# Patient Record
Sex: MALE | Race: WHITE | Employment: OTHER | URBAN - METROPOLITAN AREA
[De-identification: names, ages, dates, MRNs, and addresses within clinical notes are randomized per-mention and may not be internally consistent; named-entity substitution may affect disease eponyms.]

---

## 2017-03-02 ENCOUNTER — TELEPHONE (OUTPATIENT)
Dept: FAMILY MEDICINE CLINIC | Facility: CLINIC | Age: 42
End: 2017-03-02

## 2017-03-02 NOTE — TELEPHONE ENCOUNTER
Patient informed no hep c testing was done previously. Patient verbalized understanding. Patient had no further questions.

## 2017-05-18 ENCOUNTER — CHARTING TRANS (OUTPATIENT)
Dept: OTHER | Age: 42
End: 2017-05-18

## 2019-02-18 ENCOUNTER — HOSPITAL ENCOUNTER (EMERGENCY)
Facility: HOSPITAL | Age: 44
Discharge: HOME | End: 2019-02-18
Payer: COMMERCIAL

## 2019-02-18 VITALS
SYSTOLIC BLOOD PRESSURE: 129 MMHG | DIASTOLIC BLOOD PRESSURE: 77 MMHG | OXYGEN SATURATION: 98 % | RESPIRATION RATE: 18 BRPM | TEMPERATURE: 98 F | HEART RATE: 75 BPM

## 2019-02-18 DIAGNOSIS — S01.112A EYEBROW LACERATION, LEFT, INITIAL ENCOUNTER: Primary | ICD-10-CM
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
## 2019-02-18 DIAGNOSIS — S00.81XA FOREHEAD ABRASION, INITIAL ENCOUNTER: ICD-10-CM

## 2019-02-18 PROCEDURE — 12013 RPR F/E/E/N/L/M 2.6-5.0 CM: CPT

## 2019-02-18 PROCEDURE — 99283 EMERGENCY DEPT VISIT LOW MDM: CPT

## 2019-02-18 NOTE — ED PROVIDER NOTES
Patient Seen in: [facility] Emergency Department    History   CC: eyebrow lac  HPI: Iwona Francois 41year old male  who presents to the ER c/o left eyebrow laceration status post injury in which patient states he was walking to his car and tripped a 98.1 °F (36.7 °C) (Oral)   Resp 18   SpO2 100%         General - Appears well, non-toxic and in NAD  Head - +multiple abrasions noted to forehead along w/ lac to left eyebrow.  Scalp symmetrical. No tenderness on palpation throughout, TMJ bilat without crep visualized or palpated. The wound was repaired with 4.0 prolene - 5 simple interrupted sutures placed. The wound repair was simple. The procedure was performed by myself. Pt tolerated the procedure well.     Wound care instructions reviewed with patient as

## 2019-02-25 ENCOUNTER — WALK IN (OUTPATIENT)
Dept: URGENT CARE | Age: 44
End: 2019-02-25

## 2019-02-25 DIAGNOSIS — Z48.02 VISIT FOR SUTURE REMOVAL: Primary | ICD-10-CM

## 2019-02-25 PROCEDURE — 99213 OFFICE O/P EST LOW 20 MIN: CPT | Performed by: FAMILY MEDICINE

## 2019-02-25 RX ORDER — ALBUTEROL SULFATE 90 UG/1
1-2 AEROSOL, METERED RESPIRATORY (INHALATION) PRN
COMMUNITY
Start: 2016-09-21

## 2019-02-25 ASSESSMENT — PAIN SCALES - GENERAL: PAINLEVEL: 1-2

## 2019-10-15 ENCOUNTER — PATIENT OUTREACH (OUTPATIENT)
Dept: FAMILY MEDICINE CLINIC | Facility: CLINIC | Age: 44
End: 2019-10-15

## 2020-02-06 ENCOUNTER — PATIENT OUTREACH (OUTPATIENT)
Dept: FAMILY MEDICINE CLINIC | Facility: CLINIC | Age: 45
End: 2020-02-06

## 2020-08-12 PROBLEM — H69.83 EUSTACHIAN TUBE DYSFUNCTION, BILATERAL: Status: ACTIVE | Noted: 2020-08-12

## 2020-08-12 PROBLEM — H93.299 ABNORMAL AUDITORY PERCEPTION, UNSPECIFIED LATERALITY: Status: ACTIVE | Noted: 2020-08-12

## 2020-08-12 PROBLEM — H69.93 EUSTACHIAN TUBE DYSFUNCTION, BILATERAL: Status: ACTIVE | Noted: 2020-08-12

## 2021-05-25 VITALS
DIASTOLIC BLOOD PRESSURE: 72 MMHG | HEART RATE: 62 BPM | OXYGEN SATURATION: 98 % | TEMPERATURE: 97.5 F | SYSTOLIC BLOOD PRESSURE: 110 MMHG | RESPIRATION RATE: 16 BRPM

## 2023-09-07 ENCOUNTER — OFFICE VISIT (OUTPATIENT)
Dept: SURGERY | Facility: CLINIC | Age: 48
End: 2023-09-07

## 2023-09-07 DIAGNOSIS — Z12.5 SCREENING PSA (PROSTATE SPECIFIC ANTIGEN): ICD-10-CM

## 2023-09-07 DIAGNOSIS — R82.90 URINE FINDING: Primary | ICD-10-CM

## 2023-09-07 LAB
APPEARANCE: CLEAR
BILIRUBIN: NEGATIVE
GLUCOSE (URINE DIPSTICK): NEGATIVE MG/DL
KETONES (URINE DIPSTICK): NEGATIVE MG/DL
LEUKOCYTES: NEGATIVE
MULTISTIX LOT#: ABNORMAL NUMERIC
NITRITE, URINE: NEGATIVE
PH, URINE: 6 (ref 4.5–8)
PROTEIN (URINE DIPSTICK): NEGATIVE MG/DL
SPECIFIC GRAVITY: 1.02 (ref 1–1.03)
URINE-COLOR: YELLOW
UROBILINOGEN,SEMI-QN: 0.2 MG/DL (ref 0–1.9)

## 2023-09-07 PROCEDURE — 81003 URINALYSIS AUTO W/O SCOPE: CPT | Performed by: UROLOGY

## 2023-09-07 PROCEDURE — 99204 OFFICE O/P NEW MOD 45 MIN: CPT | Performed by: UROLOGY

## 2023-09-07 RX ORDER — TAMSULOSIN HYDROCHLORIDE 0.4 MG/1
0.4 CAPSULE ORAL EVERY EVENING
Qty: 90 CAPSULE | Refills: 2 | Status: SHIPPED | OUTPATIENT
Start: 2023-09-07

## 2023-09-07 NOTE — PROGRESS NOTES
Urology Clinic Note - New Patient    Referring Provider:  No referring provider defined for this encounter. Primary Care Provider:  No primary care provider on file. Chief Complaint:   Elevated PSA, LUTS    HPI:   Asa Brooks is a 41year old male with history of esophagitis and asthma referred for LUTS, elevated PSA. Patient was previously seen by Dr. Ba Hinojosa in 2015- at that time he reported a history of scrotal surgery as a child in [facility]. He also reported ongoing scrotal pain and was noted to have skin varices of the scrotum and lateral aspect of the penile shaft. He was also given Vesicare for OAB. A scrotal US on 12/29/2015 showed small hyperechoic focus on the left testicle that was stable from 2010- possible area of scarring; otherwise imaging was normal. He was then lost to follow up. He now returns to discuss recent LUTS and elevated PSA. Patient states at baseline he has mild/moderate LUTS; namely frequency and urgency which is worsened with alcohol and caffeine. More recently on 8/27 he experienced worsening LUTS with dysuria and was seen a an [facility] care center. UA was negative but WBC was mildly elevated at 14; he was treated for UTI vs prostatitis with 10 days of abx and his symptoms greatly improved. Of note, a PSA was checked at time of symptoms and was 12.64. No previous PSA's to compare. Today, he notes his dysuria and pain are completely resolved after completing antibiotics. UA today is negative. He does have ongoing LUTS with AUAS of 19. This is slightly worsened from his baseline. His main complains are incomplete emptying and frequency with urgency. He does continued to have varices of his scrotum; this is minimally painful. He denies any gross hematuria. No other history of UTI or nephrolithiasis.          Post-void residual bladder scan: 54 ml   Urinalysis (clinic dip): negative    PSA:  No results found for: PSA, PERCENTPSA, PSAS, PSAULTRA, QPSA, PSATOT, TOTPSADX, TOTPSASCREEN     History:     Past Medical History:   Diagnosis Date    Extrinsic asthma, unspecified        Past Surgical History:   Procedure Laterality Date    UPPER GI ENDOSCOPY - REFERRAL  5/2013    no celiac/hpylori. small hiat hernia, small ring. UPPER GI ENDOSCOPY,BIOPSY  5/1/2013    Procedure: ESOPHAGOGASTRODUODENOSCOPY, POSSIBLE BIOPSY, POSSIBLE POLYPECTOMY 88625;  Surgeon: Javier Melgoza MD;  Location: [address] [place] [address]       Family History   Problem Relation Age of Onset    Other (Other[other]) Other         Peptic ulcers run in the family     Grandfather with prsotate cancer     Social History     Socioeconomic History    Marital status:    Tobacco Use    Smoking status: Never    Smokeless tobacco: Never   Substance and Sexual Activity    Alcohol use: Yes     Comment: 1x wk    Drug use: No       Medications (Active prior to today's visit):  Current Outpatient Medications   Medication Sig Dispense Refill    Omeprazole 40 MG Oral Capsule Delayed Release Take 1 capsule (40 mg total) by mouth daily. Before meal 90 capsule 3       Allergies:  No Known Allergies      Review of Systems:   A comprehensive 10-point review of systems was completed. Pertinent positives and negatives are noted in the the HPI. Physical Exam:   CONSTITUTIONAL: Well developed, well nourished, in no acute distress  NEUROLOGIC: Alert and oriented  HEAD: Normocephalic, atraumatic  EYES: Sclera non-icteric  ENT: Hearing intact, moist mucous membranes  NECK: No obvious goiter or masses  RESPIRATORY: Normal respiratory effort  SKIN: No evident rashes  ABDOMEN: Soft, non-tender, non-distended,   GENITOURINARY: Normal phallus, orthotopic meatus, normal bilateral testicles, significant scrotal wall varices; moreso on the left side extending posteriorly; this area is non tender and there is no overlying skin changes.    DIGITAL RECTAL EXAM: ~30/40 gram prostate, no nodules or tenderness- no palpable abnormalities Assessment & Plan:     Americo Carter is a 41year old male with history of esophagitis and asthma referred for LUTS, elevated PSA. # LUTS:   - Patient with baseline LUTS that have progressed recently. Unclear etiology of his dysuria and pelvic pain which prompted a IC visit 2 weeks prior; however symptoms have resolved since then after antibiotics. His UA today is negative. We discussed trial of Flomax and he is in agreement. Discussed s/e profile in detail. If symptoms do not improve, can consider anticholinergic due to the urgency component of his symptoms. Can also consider cystoscopy for further evaluation at follow up. # Elevated PSA:   - PSA was checked at time of possible infection and therefore likely falsely elevated. Will recheck and follow up at time of next clinic visit. I asked patient to go to lab once his symptoms have been resolved for 1 month. Thank you for this consult. I have personally reviewed all relevant medical records, labs, and imaging.             Sumeet Clements MD  Staff Urologist  [place] [id number]  Office: [phone]

## 2023-10-30 ENCOUNTER — LAB ENCOUNTER (OUTPATIENT)
Dept: LAB | Facility: HOSPITAL | Age: 48
End: 2023-10-30
Attending: UROLOGY
Payer: COMMERCIAL

## 2023-10-30 DIAGNOSIS — Z12.5 SCREENING PSA (PROSTATE SPECIFIC ANTIGEN): ICD-10-CM

## 2023-10-30 LAB — COMPLEXED PSA SERPL-MCNC: 4.03 NG/ML (ref ?–4)

## 2023-10-30 PROCEDURE — 36415 COLL VENOUS BLD VENIPUNCTURE: CPT

## 2023-11-02 ENCOUNTER — OFFICE VISIT (OUTPATIENT)
Dept: SURGERY | Facility: CLINIC | Age: 48
End: 2023-11-02
Payer: COMMERCIAL

## 2023-11-02 DIAGNOSIS — R82.90 URINE FINDING: Primary | ICD-10-CM

## 2023-11-02 LAB
APPEARANCE: CLEAR
BILIRUBIN: NEGATIVE
GLUCOSE (URINE DIPSTICK): NEGATIVE MG/DL
KETONES (URINE DIPSTICK): NEGATIVE MG/DL
MULTISTIX LOT#: ABNORMAL NUMERIC
NITRITE, URINE: NEGATIVE
OCCULT BLOOD: NEGATIVE
PH, URINE: 7.5 (ref 4.5–8)
PROTEIN (URINE DIPSTICK): NEGATIVE MG/DL
SPECIFIC GRAVITY: 1.02 (ref 1–1.03)
URINE-COLOR: YELLOW
UROBILINOGEN,SEMI-QN: 0.2 MG/DL (ref 0–1.9)

## 2023-11-02 PROCEDURE — 81003 URINALYSIS AUTO W/O SCOPE: CPT | Performed by: UROLOGY

## 2023-11-02 PROCEDURE — 99214 OFFICE O/P EST MOD 30 MIN: CPT | Performed by: UROLOGY

## 2023-11-02 NOTE — PROGRESS NOTES
Urology Clinic Note    Primary Care Provider:  No primary care provider on file. Chief Complaint:     Zora Bullock    HPI:     Sujit Tolentino is a 41year old male with history of esophagitis and asthma referred for LUTS, elevated PSA. Patient was previously seen by Dr. Syed Joshi in 2015- at that time he reported a history of scrotal surgery as a child in [facility]. He also reported ongoing scrotal pain and was noted to have skin varices of the scrotum and lateral aspect of the penile shaft. He was also given Vesicare for OAB. A scrotal US on 12/29/2015 showed small hyperechoic focus on the left testicle that was stable from 2010- possible area of scarring; otherwise imaging was normal. He was then lost to follow up. Did have a grandfather with prostate cancer. He then presented to me to discuss recent LUTS and elevated PSA on 9/7/23. Patient states at baseline he has mild/moderate LUTS; namely frequency and urgency which is worsened with alcohol and caffeine. More recently on 8/27 he experienced worsening LUTS with dysuria and was seen a an immediate care center. UA was negative but WBC was mildly elevated at 14; he was treated for UTI vs prostatitis with 10 days of abx and his symptoms greatly improved. Of note, a PSA was checked at time of symptoms and was 12.64. (records were brought to clinic). Previous PSA 0.69 in 01/23. On my evaluation, his symptoms had completely resolved with antibiotics. His AUA SS was 19 at that time. Of note, also has significant varices of his scrotum as noted above. This has been stable. We discussed starting flomax for his symptoms and repeating a PSA once no symptoms for 1 month. He returns to discuss. Repeat PSA was 4.03 (10/30/23) 2 mo after his dysuria. AUA SS is now 8 and UA with trace L, otherwise negative. He stopped the Flomax as he had side effects and his symptoms have improved regardless. He has no ongoing pain or symptoms.    No pain with scrotal varices- no changes to this since his initial visit in 2015. PSA:  Lab Results   Component Value Date    PSAS 4.03 (H) 10/30/2023        History:     Past Medical History:   Diagnosis Date    Extrinsic asthma, unspecified        Past Surgical History:   Procedure Laterality Date    UPPER GI ENDOSCOPY - REFERRAL  5/2013    no celiac/hpylori. small hiat hernia, small ring. UPPER GI ENDOSCOPY,BIOPSY  5/1/2013    Procedure: ESOPHAGOGASTRODUODENOSCOPY, POSSIBLE BIOPSY, POSSIBLE POLYPECTOMY 13393;  Surgeon: Petros Rodas MD;  Location: [address] [place] [address]       Family History   Problem Relation Age of Onset    Other (Other[other]) Other         Peptic ulcers run in the family       Social History     Socioeconomic History    Marital status:    Tobacco Use    Smoking status: Never    Smokeless tobacco: Never   Substance and Sexual Activity    Alcohol use: Yes     Comment: 1x wk    Drug use: No       Medications (Active prior to today's visit):  Current Outpatient Medications   Medication Sig Dispense Refill    tamsulosin 0.4 MG Oral Cap Take 1 capsule (0.4 mg total) by mouth every evening. 90 capsule 2    Omeprazole 40 MG Oral Capsule Delayed Release Take 1 capsule (40 mg total) by mouth daily. Before meal (Patient not taking: Reported on 9/7/2023) 90 capsule 3       Allergies:  No Known Allergies    Review of Systems:   A comprehensive 10-point review of systems was completed. Pertinent positives and negatives are noted in the the HPI. Physical Exam:   CONSTITUTIONAL: Well developed, well nourished, in no acute distress  NEUROLOGIC: Alert and oriented  ABDOMEN: Soft, non-tender, non-distended     Assessment & Plan:     Jayy Silverman is a 41year old male with history of esophagitis and asthma referred for LUTS, elevated PSA. # LUTS:   Symptoms started 2 months ago, improved with abx. Symptoms much improved now. No s/s of infection. Feels well overall.  Can consider OAB treatment in the future if indicated. Ok to defer flomax given side effects; he states his symptoms have improved without medication. # Elev PSA:   Favorable trend over past 2 months; was initially elevated at time of possible UTI (although culture was negative). We discuss proceeding with biopsy, MR or further PSA testing. For now, given almost normalization of PSA will plan for 4k score testing in about 1 month; if high or equivicol risk will proceed with Hieu Niece biopsy per our discussion today. Of note, baseline earier this year was ~0.7. Telephone call in 5 weeks- complete 4k score 4 weeks from now     In total, 30 minutes were spent on this patient encounter (including chart review, patient history, physical, and counseling, documentation, and communication).       Cammie Vasques MD  Staff Urologist  [facility]  Office: [phone]

## 2023-11-28 ENCOUNTER — LAB ENCOUNTER (OUTPATIENT)
Dept: LAB | Facility: HOSPITAL | Age: 48
End: 2023-11-28
Attending: UROLOGY
Payer: COMMERCIAL

## 2023-11-28 DIAGNOSIS — R97.20 ELEVATED PSA: Primary | ICD-10-CM

## 2023-11-28 PROCEDURE — 36415 COLL VENOUS BLD VENIPUNCTURE: CPT

## 2023-12-06 ENCOUNTER — TELEPHONE (OUTPATIENT)
Dept: SURGERY | Facility: CLINIC | Age: 48
End: 2023-12-06

## 2023-12-07 ENCOUNTER — TELEPHONE (OUTPATIENT)
Dept: SURGERY | Facility: CLINIC | Age: 48
End: 2023-12-07

## 2023-12-07 DIAGNOSIS — Z12.5 SCREENING PSA (PROSTATE SPECIFIC ANTIGEN): Primary | ICD-10-CM

## 2023-12-07 NOTE — TELEPHONE ENCOUNTER
Called patient to discuss results of 4k score. PSA now 2.0   4k score is 1.6  fPsa 21     Discussed overall reassuring findings- will plan for 3mo follow up with PSA. Order placed. I offered a call from our clinic to schedule, he said he would prefer to call us after he reviews his schedule.

## (undated) NOTE — ED AVS SNAPSHOT
David Jaci   MRN: [id number]    Department:  [facility] Emergency Department   Date of Visit:  2/18/2019           Disclosure     Insurance plans vary and the physician(s) referred by the ER may not be covered by your plan.  Please contact CARE PHYSICIAN AT ONCE OR RETURN IMMEDIATELY TO THE EMERGENCY DEPARTMENT. If you have been prescribed any medication(s), please fill your prescription right away and begin taking the medication(s) as directed.   If you believe that any of the medications